# Patient Record
Sex: MALE | Race: WHITE | Employment: OTHER | URBAN - METROPOLITAN AREA
[De-identification: names, ages, dates, MRNs, and addresses within clinical notes are randomized per-mention and may not be internally consistent; named-entity substitution may affect disease eponyms.]

---

## 2018-07-25 PROBLEM — R09.02 HYPOXIA: Status: ACTIVE | Noted: 2018-07-25

## 2018-07-25 PROBLEM — A41.9 SEPSIS (HCC): Status: ACTIVE | Noted: 2018-07-25

## 2018-07-25 PROBLEM — R00.0 TACHYCARDIA: Status: ACTIVE | Noted: 2018-07-25

## 2018-12-06 RX ORDER — WARFARIN 4 MG/1
4 TABLET ORAL DAILY
Status: ON HOLD | COMMUNITY
End: 2021-11-17 | Stop reason: SDUPTHER

## 2018-12-06 RX ORDER — LOSARTAN POTASSIUM 25 MG/1
25 TABLET ORAL DAILY
COMMUNITY
End: 2021-03-22

## 2018-12-06 NOTE — PERIOP NOTES
PAT - SURGICAL PRE-ADMISSION INSTRUCTIONS    NAME:  Gena Tee                                                          TODAY'S DATE:  12/6/2018    SURGERY DATE:  12/10/2018                                  SURGERY ARRIVAL TIME:   0945    1. Do NOT eat or drink anything, including candy or gum, after MIDNIGHT on 12/09/18 , unless you have specific instructions from your Surgeon or Anesthesia Provider to do so. 2. No smoking on the day of surgery. 3. No alcohol 24 hours prior to the day of surgery. 4. No recreational drugs for one week prior to the day of surgery. 5. Leave all valuables, including money/purse, at home. 6. Remove all jewelry, nail polish, makeup (including mascara); no lotions, powders, deodorant, or perfume/cologne/after shave. 7. Glasses/Contact lenses and Dentures may be worn to the hospital.  They will be removed prior to surgery. 8. Call your doctor if symptoms of a cold or illness develop within 24 ours prior to surgery. 9. AN ADULT MUST DRIVE YOU HOME AFTER OUTPATIENT SURGERY. 10. If you are having an OUTPATIENT procedure, please make arrangements for a responsible adult to be with you for 24 hours after your surgery. 11. If you are admitted to the hospital, you will be assigned to a bed after surgery is complete. Normally a family member will not be able to see you until you are in your assigned bed. 15. Family is encouraged to accompany you to the hospital.  We ask visitors in the treatment area to be limited to ONE person at a time to ensure patient privacy. EXCEPTIONS WILL BE MADE AS NEEDED. 15. Children under 12 are discouraged from entering the treatment area and need to be supervised by an adult when in the waiting room. Special Instructions:     Take these medications the morning of surgery with a sip of water:  NONE, Complete bowel prep per MD instructions., STOP anticoagulants AT LEAST 1 WEEK PRIOR to your surgery or, follow other MD instructions:  Coumadin stopped 12/4/18    Patient Prep:    shower with anti-bacterial soap    These surgical instructions were reviewed with Yousif Choi during the PAT phone call. Directions: On the morning of surgery, please go to the [facility]. Enter the building from the [facility] entrance, 1st floor (next to the Emergency Room entrance). Take the elevator to the 2nd floor. Sign in at the Registration Desk.     If you have any questions and/or concerns, please do not hesitate to call:  (Prior to the day of surgery)  [facility] unit:  [phone]  (Day of surgery)  [facility] unit:  [phone]

## 2018-12-07 ENCOUNTER — ANESTHESIA EVENT (OUTPATIENT)
Dept: ENDOSCOPY | Age: 58
End: 2018-12-07
Payer: COMMERCIAL

## 2018-12-10 ENCOUNTER — HOSPITAL ENCOUNTER (OUTPATIENT)
Age: 58
Setting detail: OUTPATIENT SURGERY
Discharge: HOME | End: 2018-12-10
Attending: INTERNAL MEDICINE | Admitting: INTERNAL MEDICINE
Payer: COMMERCIAL

## 2018-12-10 ENCOUNTER — ANESTHESIA (OUTPATIENT)
Dept: ENDOSCOPY | Age: 58
End: 2018-12-10
Payer: COMMERCIAL

## 2018-12-10 VITALS
WEIGHT: 313.31 LBS | HEIGHT: 72 IN | DIASTOLIC BLOOD PRESSURE: 69 MMHG | RESPIRATION RATE: 20 BRPM | BODY MASS INDEX: 42.43 KG/M2 | TEMPERATURE: 97.9 F | HEART RATE: 89 BPM | OXYGEN SATURATION: 99 % | SYSTOLIC BLOOD PRESSURE: 139 MMHG

## 2018-12-10 LAB
BUN BLD-MCNC: 11 MG/DL (ref 7–18)
BUN BLD-MCNC: 8 MG/DL (ref 7–18)
CHLORIDE BLD-SCNC: 101 MMOL/L (ref 100–108)
CHLORIDE BLD-SCNC: 97 MMOL/L (ref 100–108)
GLUCOSE BLD STRIP.AUTO-MCNC: 113 MG/DL (ref 74–106)
GLUCOSE BLD STRIP.AUTO-MCNC: 116 MG/DL (ref 74–106)
HCT VFR BLD CALC: 41 % (ref 36–49)
HCT VFR BLD CALC: 41 % (ref 36–49)
HGB BLD-MCNC: 13.9 G/DL (ref 12–16)
HGB BLD-MCNC: 13.9 G/DL (ref 12–16)
POTASSIUM BLD-SCNC: 3.9 MMOL/L (ref 3.5–5.5)
POTASSIUM BLD-SCNC: 8.9 MMOL/L (ref 3.5–5.5)
SODIUM BLD-SCNC: 133 MMOL/L (ref 136–145)
SODIUM BLD-SCNC: 136 MMOL/L (ref 136–145)

## 2018-12-10 PROCEDURE — 76060000032 HC ANESTHESIA 0.5 TO 1 HR: Performed by: INTERNAL MEDICINE

## 2018-12-10 PROCEDURE — 74011250636 HC RX REV CODE- 250/636: Performed by: NURSE ANESTHETIST, CERTIFIED REGISTERED

## 2018-12-10 PROCEDURE — 76040000007: Performed by: INTERNAL MEDICINE

## 2018-12-10 PROCEDURE — 84295 ASSAY OF SERUM SODIUM: CPT

## 2018-12-10 PROCEDURE — 74011250636 HC RX REV CODE- 250/636

## 2018-12-10 PROCEDURE — 88305 TISSUE EXAM BY PATHOLOGIST: CPT

## 2018-12-10 PROCEDURE — 88342 IMHCHEM/IMCYTCHM 1ST ANTB: CPT

## 2018-12-10 PROCEDURE — 77030031670 HC DEV INFL ENDOTEK BIG60 MRTM -B: Performed by: INTERNAL MEDICINE

## 2018-12-10 PROCEDURE — 77030009426 HC FCPS BIOP ENDOSC BSC -B: Performed by: INTERNAL MEDICINE

## 2018-12-10 RX ORDER — SODIUM CHLORIDE 0.9 % (FLUSH) 0.9 %
5-10 SYRINGE (ML) INJECTION EVERY 8 HOURS
Status: DISCONTINUED | OUTPATIENT
Start: 2018-12-10 | End: 2018-12-10 | Stop reason: HOSPADM

## 2018-12-10 RX ORDER — PROPOFOL 10 MG/ML
INJECTION, EMULSION INTRAVENOUS AS NEEDED
Status: DISCONTINUED | OUTPATIENT
Start: 2018-12-10 | End: 2018-12-10 | Stop reason: HOSPADM

## 2018-12-10 RX ORDER — DEXTROMETHORPHAN/PSEUDOEPHED 2.5-7.5/.8
1.2 DROPS ORAL
Status: CANCELLED | OUTPATIENT
Start: 2018-12-10

## 2018-12-10 RX ORDER — INSULIN LISPRO 100 [IU]/ML
INJECTION, SOLUTION INTRAVENOUS; SUBCUTANEOUS ONCE
Status: DISCONTINUED | OUTPATIENT
Start: 2018-12-10 | End: 2018-12-10 | Stop reason: HOSPADM

## 2018-12-10 RX ORDER — LIDOCAINE HYDROCHLORIDE 20 MG/ML
INJECTION, SOLUTION EPIDURAL; INFILTRATION; INTRACAUDAL; PERINEURAL AS NEEDED
Status: DISCONTINUED | OUTPATIENT
Start: 2018-12-10 | End: 2018-12-10 | Stop reason: HOSPADM

## 2018-12-10 RX ORDER — ONDANSETRON 2 MG/ML
4 INJECTION INTRAMUSCULAR; INTRAVENOUS ONCE
Status: CANCELLED | OUTPATIENT
Start: 2018-12-10 | End: 2018-12-10

## 2018-12-10 RX ORDER — LIDOCAINE HYDROCHLORIDE 10 MG/ML
0.1 INJECTION, SOLUTION EPIDURAL; INFILTRATION; INTRACAUDAL; PERINEURAL AS NEEDED
Status: DISCONTINUED | OUTPATIENT
Start: 2018-12-10 | End: 2018-12-10 | Stop reason: HOSPADM

## 2018-12-10 RX ORDER — DEXTROSE MONOHYDRATE 25 G/50ML
25-50 INJECTION, SOLUTION INTRAVENOUS AS NEEDED
Status: DISCONTINUED | OUTPATIENT
Start: 2018-12-10 | End: 2018-12-10 | Stop reason: HOSPADM

## 2018-12-10 RX ORDER — FAMOTIDINE 20 MG/1
20 TABLET, FILM COATED ORAL ONCE
Status: DISCONTINUED | OUTPATIENT
Start: 2018-12-10 | End: 2018-12-10 | Stop reason: HOSPADM

## 2018-12-10 RX ORDER — SODIUM CHLORIDE, SODIUM LACTATE, POTASSIUM CHLORIDE, CALCIUM CHLORIDE 600; 310; 30; 20 MG/100ML; MG/100ML; MG/100ML; MG/100ML
50 INJECTION, SOLUTION INTRAVENOUS CONTINUOUS
Status: DISCONTINUED | OUTPATIENT
Start: 2018-12-10 | End: 2018-12-10 | Stop reason: HOSPADM

## 2018-12-10 RX ORDER — SODIUM CHLORIDE, SODIUM LACTATE, POTASSIUM CHLORIDE, CALCIUM CHLORIDE 600; 310; 30; 20 MG/100ML; MG/100ML; MG/100ML; MG/100ML
25 INJECTION, SOLUTION INTRAVENOUS CONTINUOUS
Status: CANCELLED | OUTPATIENT
Start: 2018-12-10

## 2018-12-10 RX ORDER — SODIUM CHLORIDE 0.9 % (FLUSH) 0.9 %
5-10 SYRINGE (ML) INJECTION AS NEEDED
Status: DISCONTINUED | OUTPATIENT
Start: 2018-12-10 | End: 2018-12-10 | Stop reason: HOSPADM

## 2018-12-10 RX ORDER — MAGNESIUM SULFATE 100 %
4 CRYSTALS MISCELLANEOUS AS NEEDED
Status: DISCONTINUED | OUTPATIENT
Start: 2018-12-10 | End: 2018-12-10 | Stop reason: HOSPADM

## 2018-12-10 RX ADMIN — PROPOFOL 30 MG: 10 INJECTION, EMULSION INTRAVENOUS at 10:53

## 2018-12-10 RX ADMIN — PROPOFOL 20 MG: 10 INJECTION, EMULSION INTRAVENOUS at 10:48

## 2018-12-10 RX ADMIN — PROPOFOL 60 MG: 10 INJECTION, EMULSION INTRAVENOUS at 11:00

## 2018-12-10 RX ADMIN — PROPOFOL 50 MG: 10 INJECTION, EMULSION INTRAVENOUS at 10:46

## 2018-12-10 RX ADMIN — LIDOCAINE HYDROCHLORIDE 100 MG: 20 INJECTION, SOLUTION EPIDURAL; INFILTRATION; INTRACAUDAL; PERINEURAL at 10:45

## 2018-12-10 RX ADMIN — PROPOFOL 40 MG: 10 INJECTION, EMULSION INTRAVENOUS at 10:57

## 2018-12-10 RX ADMIN — PROPOFOL 50 MG: 10 INJECTION, EMULSION INTRAVENOUS at 10:47

## 2018-12-10 RX ADMIN — PROPOFOL 50 MG: 10 INJECTION, EMULSION INTRAVENOUS at 10:45

## 2018-12-10 RX ADMIN — PROPOFOL 50 MG: 10 INJECTION, EMULSION INTRAVENOUS at 10:55

## 2018-12-10 RX ADMIN — SODIUM CHLORIDE, SODIUM LACTATE, POTASSIUM CHLORIDE, AND CALCIUM CHLORIDE 50 ML/HR: 600; 310; 30; 20 INJECTION, SOLUTION INTRAVENOUS at 10:11

## 2018-12-10 NOTE — PROCEDURES
Colonoscopy Report    Patient: Eleazar Lutz MRN: [id number]  SSN: [id number]    YOB: 1960  Age: 60 y.o. Sex: male      Date of Procedure: 12/10/2018    IMPRESSION:  1. S/P Right colectomy  2. normal colonic mucosa throughout  3. Normal terminal Ileum  4. hemorrhoids internal, Moderate in size   5. Random colon biopsies obtained    RECOMMENDATIONS:  1. Await pathology. 2. Repeat colonoscopy in 5 years. 3. Follow up with primary care physician. 4. Call in 2 weeks    Indication:  Personal history of colon cancer (screening only)  Procedure Performed: Colonoscopy biopsy  Endoscopist: Gisela See MD  Assistant: Endoscopy Technician-1: Mac Alonzo  Endoscopy RN-1: Belle Summers RN  ASA: ASA 2 - Patient with mild systemic disease with no functional limitations  Mallampati Score: II (soft palate, uvula, fauces visible)  Anesthesia: MAC anesthesia  Endoscope: [id number]  Extent of Examination:terminal ileum  Quality of Preparation:     []  Excellent   [x]  Very Good   [] Fair but adequate   [] Fair, inadequate   []  Poor      Technique: The procedure was discussed with the patient including risks, benefits, alternatives including risks of IV sedation, bleeding, perforation and missed polyp. A safety timeout was performed. The patient was given incremental doses of Versed and Demerol to achieve moderate conscious sedation. The patients vital signs were monitored at all times including heart rate, rhythm, blood pressure and oxygen saturation. The patient was placed in left lateral position. When adequate sedation was achieved a perianal inspection and a digital rectal exam were performed. Video colonoscope was introduced into the rectum and advanced under direct vision up to the terminal ileum. The cecum was identified by IC valve, appendiceal orifice and crows foot. With adequate insufflation and maneuvering of the withdrawing scope, the colonic mucosa was visualized carefully. Retroflexion was performed in the rectum and the distal rectum visualized. The patient tolerated the procedure very well and was transferred to recovery area. Findings:  1. S/p Right colectomy  2. Normal colonoscopy to the terminal ileum, with no evidence of neoplasia, diverticular disease or mucosal abnormality.    3. Moderate internal hemorrhoids  EBL: Minimal  Specimen:   ID Type Source Tests Collected by Time Destination   1 : bx duodenum r/o celiac Preservative Duodenum  Lorena Lopez MD 12/10/2018 1048 Pathology   2 : bx GE junction r/o B  arretts Preservative Esophagus, Distal  Lorena Lopez MD 12/10/2018 1050 Pathology   3 : bx random gastric r/o hpylori Preservative Gastric  Lorena Lopez MD 12/10/2018 1052 Pathology   4 : random colon bx r/o microscopic colitis Preservative Colon  Lorena Lopez MD 12/10/2018 1101 Pathology       Saravanan Ortiz MD, Melisa [facility]  December 10, 2018  11:13 AM

## 2018-12-10 NOTE — ANESTHESIA POSTPROCEDURE EVALUATION
Procedure(s): ESOPHAGOGASTRODUODENOSCOPY 
COLONOSCOPY. Anesthesia Post Evaluation Multimodal analgesia: multimodal analgesia not used between 6 hours prior to anesthesia start to PACU discharge Patient location: [facility]. Level of consciousness: awake and alert Pain score: 0 Airway patency: patent Anesthetic complications: no 
Cardiovascular status: stable Respiratory status: room air Hydration status: stable Post anesthesia nausea and vomiting:  none Visit Vitals /69 Pulse 89 Temp 36.6 °C (97.9 °F) Resp 20 Ht 6' (1.829 m) Wt 142.1 kg (313 lb 5 oz) SpO2 99% BMI 42.49 kg/m²

## 2018-12-10 NOTE — PROCEDURES
Endoscopy Procedure Note    Patient: Desi Camp MRN: [id number]  SSN: [id number]    YOB: 1960  Age: 60 y.o. Sex: male      Date/Time:  12/10/2018 11:16 AM    Esophagogastroduodenoscopy (EGD) Procedure Note    Procedure: Esophagogastroduodenoscopy with biopsy    IMPRESSION:   1. -Infammation at E-G-Bx'd  2. - diffuse  Gastritis- bx'd  3. -Otherwise normal upper endoscopy. 4. - Duodenum -Bx'd to r/o Celiac    RECOMMENDATIONS:  1. -Await pathology. , -Follow up with primary care physician  2. -No NSAIDS  3. -Call for biopsy results    Indication: Check for Esophageal varices  :  Gregory Jesnen MD  Referring Provider:   Volodymyr Meade MD  History: The history and physical exam were reviewed and updated. Endoscope: [id number]  Extent of Exam: third portion of the duodenum  ASA: ASA 2 - Patient with mild systemic disease with no functional limitations  Anethesia/Sedation:  MAC anesthesia    Description of the procedure: The procedure was discussed with the patient including risks, benefits, alternatives including risks of iv sedation, bleeding, perforation and aspiration. A safety timeout was performed. The patient was placed in the left lateral decubitus position. A bite block was placed. The patient was given incremental doses of intravenous sedation until moderate sedation was achieved. The patients vital signs were monitored at all times including heart rate/rhythm, blood pressure and oxygen saturation. The endoscope was then passed under direct visualization to the third portion of the duodenum. The endoscope was then slowly withdrawn while visualizing the mucosa. In the stomach a retroflexion was performed and gastric fundus and cardia visualized. The endoscope was then slowly withdrawn. The patient was then transferred to recovery in stable condition. Findings:    Esophagus: The esophageal mucosa was normal with no ulceration, mass or  stricture.   There was no evidence of Brasher's esophagus or reflux esophagitis. Stomach: The gastric mucosa was normal with no ulceration, mass, stricture. Mild diffuse gastropathy   Duodenum: The duodenum mucosa was normal with no ulceration, mass,  stricture and no evidence of villous atrophy. Therapies:  biopsy of stomach cardia, body, antrum, duodenum    Specimens:   ID Type Source Tests Collected by Time Destination   1 : bx duodenum r/o celiac Preservative Duodenum  Yady Kessler MD 12/10/2018 1048 Pathology   2 : bx GE junction r/o B  arretts Preservative Esophagus, Distal  Yady Kessler MD 12/10/2018 1050 Pathology   3 : bx random gastric r/o hpylori Preservative Gastric  Yady Kessler MD 12/10/2018 1052 Pathology   4 : random colon bx r/o microscopic colitis Preservative Colon  Yady Kessler MD 12/10/2018 1101 Pathology               Complications:   None; patient tolerated the procedure well.     EBL:Minimal    Discharge disposition:  Home in the company of  when able to ambulate    Damien Vergara MD, Holly Perez, [facility]  December 10, 2018  11:16 AM

## 2018-12-10 NOTE — DISCHARGE INSTRUCTIONS
For the next 12 hours you should not:   1. drive   2. drink alcohol   3. operate any machinery   4. engage in activities that require mental sharpness or manual dexterity such as     cooking   5. take any drugs other than those prescribed by a physician   6. make any legal or financial decisions  Call your doctor's office immediately, if:   1. increased and continuing rectal bleeding   2. fever   3. increased abdominal pain    Take it easy today and resume normal activity tomorrow. Resume previous diet. DISCHARGE SUMMARY from Nurse    PATIENT INSTRUCTIONS:    After general anesthesia or intravenous sedation, for 24 hours or while taking prescription Narcotics:  · Limit your activities  · Do not drive and operate hazardous machinery  · Do not make important personal or business decisions  · Do  not drink alcoholic beverages  · If you have not urinated within 8 hours after discharge, please contact your surgeon on call. Report the following to your surgeon:  · Excessive pain, swelling, redness or odor of or around the surgical area  · Temperature over 100.5  · Nausea and vomiting lasting longer than 4 hours or if unable to take medications  · Any signs of decreased circulation or nerve impairment to extremity: change in color, persistent  numbness, tingling, coldness or increase pain  · Any questions    What to do at Home:  These are general instructions for a healthy lifestyle:    No smoking/ No tobacco products/ Avoid exposure to second hand smoke  Surgeon General's Warning:  Quitting smoking now greatly reduces serious risk to your health.     Obesity, smoking, and sedentary lifestyle greatly increases your risk for illness    A healthy diet, regular physical exercise & weight monitoring are important for maintaining a healthy lifestyle    You may be retaining fluid if you have a history of heart failure or if you experience any of the following symptoms:  Weight gain of 3 pounds or more overnight or 5 pounds in a week, increased swelling in our hands or feet or shortness of breath while lying flat in bed. Please call your doctor as soon as you notice any of these symptoms; do not wait until your next office visit. Recognize signs and symptoms of STROKE:    F-face looks uneven    A-arms unable to move or move unevenly    S-speech slurred or non-existent    T-time-call 911 as soon as signs and symptoms begin-DO NOT go       Back to bed or wait to see if you get better-TIME IS BRAIN. Warning Signs of HEART ATTACK     Call 911 if you have these symptoms:   Chest discomfort. Most heart attacks involve discomfort in the center of the chest that lasts more than a few minutes, or that goes away and comes back. It can feel like uncomfortable pressure, squeezing, fullness, or pain.  Discomfort in other areas of the upper body. Symptoms can include pain or discomfort in one or both arms, the back, neck, jaw, or stomach.  Shortness of breath with or without chest discomfort.  Other signs may include breaking out in a cold sweat, nausea, or lightheadedness. Don't wait more than five minutes to call 911 - MINUTES MATTER! Fast action can save your life. Calling 911 is almost always the fastest way to get lifesaving treatment. Emergency Medical Services staff can begin treatment when they arrive -- up to an hour sooner than if someone gets to the hospital by car. The discharge information has been reviewed with the patient. The patient verbalized understanding. Discharge medications reviewed with the patient and appropriate educational materials and side effects teaching were provided. ___________________________________________________________________________________________________________________________________      Patient armband removed and given to patient to take home.   Patient was informed of the privacy risks if armband lost or stolen

## 2018-12-10 NOTE — PERIOP NOTES
PT arrived to Phase II by stretcher. Report rec'd from Endo RN and CRNA.      VSS. Pt oriented to room and POC. Wife, Costa Mcgregor by bedside. Dr. Sidra Ambriz by bedside to discuss procedure. Discharge to home. PT taken by [facility] as per protocol to Private vehicle driven by wife.

## 2018-12-10 NOTE — H&P
History and Physical    Annabel Castle  1960  [id number]  [id number]    Pre-Procedure Diagnosis:  z85.038 hx colon cancer k74.60 cirrhoosis      Evaluation of past illnesses, surgeries, or injuries:   YES  Past Medical History:   Diagnosis Date    Anxiety     Cervicalgia     Colon cancer ([facility] 2015    Diabetes ([facility]     Fatty liver     Gout     Hypercholesteremia     Hypertension     Lymphedema     Neuropathy     Obesity     Orthostasis     RLS (restless legs syndrome)     Sleep apnea     Was told but not tested    Stroke ([facility]     Thromboembolus ([facility] 10/2018    DVT Leg    Vitamin D deficiency      Past Surgical History:   Procedure Laterality Date    HX COLECTOMY      November 2014    HX HEENT      cyst removal throat       Allergies: Allergies   Allergen Reactions    Avelox [Moxifloxacin] Hives and Shortness of Breath       Previous reactions to sedation/analgesia? NO    Review of current medications, supplement, herbals and nutraceuticals complete:  YES  Current Facility-Administered Medications   Medication Dose Route Frequency Provider Last Rate Last Dose    lidocaine (PF) (XYLOCAINE) 10 mg/mL (1 %) injection 0.1 mL  0.1 mL SubCUTAneous PRN Ivana Cooney CRNA        lactated Ringers infusion  50 mL/hr IntraVENous CONTINUOUS Ivana Cooney CRNA 50 mL/hr at 12/10/18 1011 50 mL/hr at 12/10/18 1011    famotidine (PEPCID) tablet 20 mg  20 mg Oral ONCE Ivana Cooney CRNA        insulin lispro (HUMALOG) injection   SubCUTAneous ONCE Ivana Cooney CRNA        glucose chewable tablet 16 g  4 Tab Oral PRN Ivana Cooney CRNA        glucagon (GLUCAGEN) injection 1 mg  1 mg IntraMUSCular PRN Ivana Cooney CRNA        dextrose (D50) infusion 12.5-25 g  25-50 mL IntraVENous PRN Rodney Cooney CRNA              Pertinent labs reviewed? YES    History of substance abuse? NO  History reviewed. No pertinent family history.   Social History Socioeconomic History    Marital status:      Spouse name: Not on file    Number of children: Not on file    Years of education: Not on file    Highest education level: Not on file   Social Needs    Financial resource strain: Not on file    Food insecurity - worry: Not on file    Food insecurity - inability: Not on file    Transportation needs - medical: Not on file   [employer] needs - non-medical: Not on file   Occupational History    Not on file   Tobacco Use    Smoking status: Former Smoker     Last attempt to quit: 1987     Years since quittin.0    Smokeless tobacco: Never Used   Substance and Sexual Activity    Alcohol use: Yes     Comment: 1/2 pint of liquor daily    Drug use: No    Sexual activity: Not on file   Other Topics Concern    Not on file   Social History Narrative    Not on file       Cardiac Status:  WNL    Mental Status:  WNL     Pulmonary Status:  WNL    NPO:  >4    Assessment/Impression: Hx of colon cancer    Plan of treatment: Colonoscopy        Susana Urena MD  12/10/2018  10:36 AM

## 2018-12-10 NOTE — ANESTHESIA PREPROCEDURE EVALUATION
Anesthetic History No history of anesthetic complications Review of Systems / Medical History Patient summary reviewed and pertinent labs reviewed Pulmonary Within defined limits Sleep apnea: No treatment Neuro/Psych Within defined limits TIA Cardiovascular Within defined limits Hypertension Exercise tolerance: >4 METS 
  
GI/Hepatic/Renal 
Within defined limits Liver disease Endo/Other Diabetes: type 2 Morbid obesity Other Findings Physical Exam 
 
Airway Mallampati: III 
TM Distance: 4 - 6 cm Neck ROM: normal range of motion Mouth opening: Normal 
 
 Cardiovascular Regular rate and rhythm,  S1 and S2 normal,  no murmur, click, rub, or gallop Rhythm: regular Rate: normal 
 
 
 
 Dental 
 
Dentition: Lower dentition intact and Upper dentition intact Comments: Several missing teeth. Loose L lower molar. Pulmonary Breath sounds clear to auscultation Abdominal 
GI exam deferred Other Findings Anesthetic Plan ASA: 3 Anesthesia type: MAC Induction: Intravenous Anesthetic plan and risks discussed with: Patient

## 2021-03-22 PROBLEM — U07.1 PNEUMONIA DUE TO COVID-19 VIRUS: Status: ACTIVE | Noted: 2021-03-22

## 2021-03-22 PROBLEM — J12.82 PNEUMONIA DUE TO COVID-19 VIRUS: Status: ACTIVE | Noted: 2021-03-22

## 2021-09-21 ENCOUNTER — IMPORTED ENCOUNTER (OUTPATIENT)
Dept: URBAN - METROPOLITAN AREA CLINIC 1 | Facility: CLINIC | Age: 61
End: 2021-09-21

## 2021-09-21 PROBLEM — INACTIVE: Noted: 2021-09-21

## 2021-09-21 PROBLEM — L03.211: Noted: 2021-09-21

## 2021-09-21 PROCEDURE — 92002 INTRM OPH EXAM NEW PATIENT: CPT

## 2021-09-21 NOTE — PATIENT DISCUSSION
1.  Cellulitus OS on nasal cheek and crossing over the nose. Advised patient to go straight to Emergency room or PCP for further evaluation and treatment. 2.  Cataract OU -- Observe. 3. Corneal Scar OD -- Observe. 4.  Dry Eyes with PEK OU -- Recommend ATs TID OU routinely. 5.  DM Type II (diet controlled) -- will return for dilated exam. 6.  H/o Stroke w/peripheral VF loss per patientReturn for an appointment in at patient's convenience for 30 with Dr. Mona Khoury.

## 2021-09-24 PROBLEM — R55 SYNCOPE: Status: ACTIVE | Noted: 2021-09-24

## 2021-09-24 PROBLEM — I89.0 LYMPHEDEMA OF BOTH LOWER EXTREMITIES: Status: ACTIVE | Noted: 2021-09-24

## 2021-09-24 PROBLEM — S09.90XA HEAD INJURY: Status: ACTIVE | Noted: 2021-09-24

## 2021-09-24 PROBLEM — Z79.01 CURRENT USE OF LONG TERM ANTICOAGULATION: Status: ACTIVE | Noted: 2021-09-24

## 2021-09-24 PROBLEM — L03.115 CELLULITIS OF RIGHT LEG: Status: ACTIVE | Noted: 2021-09-24

## 2021-11-13 PROBLEM — R47.01 APHASIA: Status: ACTIVE | Noted: 2021-11-13

## 2021-11-13 PROBLEM — R47.81 SLURRED SPEECH: Status: ACTIVE | Noted: 2021-11-13

## 2021-11-17 PROBLEM — G45.9 TIA (TRANSIENT ISCHEMIC ATTACK): Status: ACTIVE | Noted: 2021-11-17

## 2022-02-27 PROBLEM — Z86.73 HISTORY OF ISCHEMIC STROKE: Status: ACTIVE | Noted: 2022-02-27

## 2022-02-27 PROBLEM — R20.2 FACIAL PARESTHESIA: Status: ACTIVE | Noted: 2022-02-27

## 2022-02-27 PROBLEM — R47.1 DYSARTHRIA: Status: ACTIVE | Noted: 2022-02-27

## 2022-02-27 PROBLEM — R47.01 EXPRESSIVE APHASIA: Status: ACTIVE | Noted: 2022-02-27

## 2022-03-30 ENCOUNTER — ESTABLISHED PATIENT (OUTPATIENT)
Dept: URBAN - METROPOLITAN AREA CLINIC 2 | Facility: CLINIC | Age: 62
End: 2022-03-30

## 2022-03-30 DIAGNOSIS — I69.898: ICD-10-CM

## 2022-03-30 DIAGNOSIS — E11.9: ICD-10-CM

## 2022-03-30 DIAGNOSIS — H25.813: ICD-10-CM

## 2022-03-30 DIAGNOSIS — H04.123: ICD-10-CM

## 2022-03-30 DIAGNOSIS — H16.143: ICD-10-CM

## 2022-03-30 PROCEDURE — 92014 COMPRE OPH EXAM EST PT 1/>: CPT

## 2022-03-30 PROCEDURE — 92083 EXTENDED VISUAL FIELD XM: CPT

## 2022-03-30 ASSESSMENT — VISUAL ACUITY
OS_SC: J7
OS_SC: 20/50+1
OD_SC: J3
OD_SC: 20/20-2

## 2022-03-30 ASSESSMENT — TONOMETRY
OD_IOP_MMHG: 16
OS_IOP_MMHG: 15

## 2022-04-09 ASSESSMENT — VISUAL ACUITY
OS_CC: 20/40-2
OD_CC: 20/25

## 2022-04-09 ASSESSMENT — TONOMETRY
OS_IOP_MMHG: 16
OD_IOP_MMHG: 16

## 2022-04-27 PROBLEM — Z86.73 HISTORY OF STROKE: Status: ACTIVE | Noted: 2022-04-27

## 2022-04-27 PROBLEM — G45.9 TRANSIENT ISCHEMIC ATTACK: Status: ACTIVE | Noted: 2022-04-27

## 2022-04-27 PROBLEM — G45.9 TRANSIENT ISCHEMIC ATTACK (TIA): Status: ACTIVE | Noted: 2022-04-27

## 2022-04-27 PROBLEM — H53.8 BLURRED VISION: Status: ACTIVE | Noted: 2022-04-27

## (undated) DEVICE — FORCEPS BX L240CM JAW DIA2.8MM L CAP W/ NDL MIC MESH TOOTH

## (undated) DEVICE — MEDI-VAC NON-CONDUCTIVE SUCTION TUBING: Brand: CARDINAL HEALTH

## (undated) DEVICE — BITE BLK ENDOSCP AD 54FR GRN POLYETH ENDOSCP W STRP SLD

## (undated) DEVICE — SYR 50ML SLIP TIP NSAF LF STRL --

## (undated) DEVICE — KENDALL 500 SERIES DIAPHORETIC FOAM MONITORING ELECTRODE - TEAR DROP SHAPE ( 30/PK): Brand: KENDALL

## (undated) DEVICE — DEVICE INFL 60ML 12ATM CONVENIENT LOK REL HNDL HI PRSS FLX

## (undated) DEVICE — CONTAINER PREFIL FRMLN 15ML --

## (undated) DEVICE — BASIN EMESIS 500CC ROSE 250/CS 60/PLT: Brand: MEDEGEN MEDICAL PRODUCTS, LLC

## (undated) DEVICE — FLEX ADVANTAGE 1500CC: Brand: FLEX ADVANTAGE

## (undated) DEVICE — KIT COLON W/ 1.1OZ LUB AND 2 END